# Patient Record
Sex: FEMALE | Race: WHITE | URBAN - METROPOLITAN AREA
[De-identification: names, ages, dates, MRNs, and addresses within clinical notes are randomized per-mention and may not be internally consistent; named-entity substitution may affect disease eponyms.]

---

## 2017-05-01 ENCOUNTER — OFFICE VISIT (OUTPATIENT)
Dept: ORTHOPEDIC SURGERY | Age: 57
End: 2017-05-01

## 2017-05-01 VITALS
DIASTOLIC BLOOD PRESSURE: 77 MMHG | HEART RATE: 68 BPM | BODY MASS INDEX: 27.47 KG/M2 | HEIGHT: 67 IN | WEIGHT: 175 LBS | SYSTOLIC BLOOD PRESSURE: 117 MMHG

## 2017-05-01 DIAGNOSIS — M79.671 FOOT PAIN, RIGHT: Primary | ICD-10-CM

## 2017-05-01 DIAGNOSIS — M65.9 TENOSYNOVITIS OF RIGHT FOOT: ICD-10-CM

## 2017-05-01 PROCEDURE — 99203 OFFICE O/P NEW LOW 30 MIN: CPT | Performed by: PODIATRIST

## 2017-05-01 PROCEDURE — 73630 X-RAY EXAM OF FOOT: CPT | Performed by: PODIATRIST

## 2017-05-01 RX ORDER — POLYETHYLENE GLYCOL 3350 17 G/17G
17 POWDER, FOR SOLUTION ORAL DAILY PRN
COMMUNITY

## 2017-05-01 RX ORDER — M-VIT,TX,IRON,MINS/CALC/FOLIC 27MG-0.4MG
1 TABLET ORAL DAILY
COMMUNITY

## 2017-10-02 ENCOUNTER — OFFICE VISIT (OUTPATIENT)
Dept: ORTHOPEDIC SURGERY | Age: 57
End: 2017-10-02

## 2017-10-02 VITALS — BODY MASS INDEX: 27.47 KG/M2 | HEIGHT: 67 IN | WEIGHT: 175.04 LBS

## 2017-10-02 DIAGNOSIS — S83.242A TEAR OF MEDIAL MENISCUS OF LEFT KNEE, CURRENT, UNSPECIFIED TEAR TYPE, INITIAL ENCOUNTER: ICD-10-CM

## 2017-10-02 DIAGNOSIS — S83.242A TEAR OF MEDIAL MENISCUS OF LEFT KNEE, UNSPECIFIED TEAR TYPE, UNSPECIFIED WHETHER OLD OR CURRENT TEAR, INITIAL ENCOUNTER: ICD-10-CM

## 2017-10-02 DIAGNOSIS — M17.12 PATELLOFEMORAL ARTHRITIS OF LEFT KNEE: ICD-10-CM

## 2017-10-02 DIAGNOSIS — M25.561 PAIN IN BOTH KNEES, UNSPECIFIED CHRONICITY: Primary | ICD-10-CM

## 2017-10-02 DIAGNOSIS — M22.41 CHONDROMALACIA PATELLAE, RIGHT KNEE: ICD-10-CM

## 2017-10-02 DIAGNOSIS — M25.562 PAIN IN BOTH KNEES, UNSPECIFIED CHRONICITY: Primary | ICD-10-CM

## 2017-10-02 PROCEDURE — 73564 X-RAY EXAM KNEE 4 OR MORE: CPT | Performed by: ORTHOPAEDIC SURGERY

## 2017-10-02 PROCEDURE — 99214 OFFICE O/P EST MOD 30 MIN: CPT | Performed by: ORTHOPAEDIC SURGERY

## 2017-10-02 NOTE — PROGRESS NOTES
Lachman, drawer, pivot shift and posterior drawer. Good medial and lateral stability at 0 and 30°. Mild lateral tenderness in the mid body but significant posterior medial joint line tenderness with increased pain with Agapito sign. Right knee reveals mild or peripatellar and retropatellar tenderness medial and lateral facet. Negative apprehension sign. Ligamentous stability good in all directions. No significant lateral or medial joint line tenderness. Negative Agapito sign. Range of Motion:  0-125° left and right    Strength:  Quadriceps strength 3/5 left and right    Special Tests:  Negative Homans sign left and right    Skin: There are no rashes, ulcerations or lesions. Gait: Normal gait today    Reflex intact    Additional Comments:       Additional Examinations:         Right Lower Extremity: Examination of the right lower extremity does not show any tenderness, deformity or injury. Range of motion is unremarkable. There is no gross instability. There are no rashes, ulcerations or lesions. Strength and tone are normal.  Left Lower Extremity: Examination of the left lower extremity does not show any tenderness, deformity or injury. Range of motion is unremarkable. There is no gross instability. There are no rashes, ulcerations or lesions. Strength and tone are normal.    Radiology:     X-rays obtained and reviewed in office:  Views standing AP, PA, lateral and sunrise  Location left and right knee  Impression left knee reveals mild to moderate retropatellar spurring with pretty good maintenance of medial and lateral compartments. Right knee reveals only minimal retropatellar spurring and good maintenance of medial and lateral compartments. 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

## 2017-10-04 ENCOUNTER — TELEPHONE (OUTPATIENT)
Dept: ORTHOPEDIC SURGERY | Age: 57
End: 2017-10-04

## 2017-10-06 ENCOUNTER — HOSPITAL ENCOUNTER (OUTPATIENT)
Dept: PHYSICAL THERAPY | Facility: MEDICAL CENTER | Age: 57
Discharge: HOME | End: 2017-10-31
Admitting: ORTHOPAEDIC SURGERY

## 2017-10-06 ENCOUNTER — TELEPHONE (OUTPATIENT)
Dept: ORTHOPEDIC SURGERY | Age: 57
End: 2017-10-06

## 2017-10-06 NOTE — PLAN OF CARE
deg    Strength/Neuromuscular control:  Voluntary quadriceps contraction:   [] Good (Strong and voluntary contraction)  [] Good - (voluntary but assymetrical contraction)   [x] Fair + (voluntary but weak contraction)   [] Fair - (voluntary but inconsistent contraction)  [] Poor (no volitional contraction)     Right Left   Hip flexion 4 4   Hip extension 4 4   Hip abduction 4- 4-   Hip adduction     Hip IR 4 4   Hip ER 4 4   Knee flexion 4 4-   Knee extension 4+ 3-   Ankle dorsiflexion 4 4   Ankle plantarflexion 4 4   Ankle inversion     Ankle eversion     Great toe flexion     Great toe extension            Flexibility:  - Hamstring: moderate bilaterally  - Piriformis: moderate bilaterally  - Quadriceps: moderate bilaterally  - Gastrocnemius: moderate bilaterally    Dermatomal Sensation:  - Dermatomal sensation was intact to light touch bilaterally throughout upper and lower extremities. Deep tendon reflexes:  - DTR's were symmetrical and intact throughout.     Orthopedic Special Tests:    - Joint Line Compression  - patellar laxity, crepitus, tilt, height    Functional Mobility/Transfers:   Functional transitions:  Sit to stand: []slow and labored [x]independent []min assist []mod assist []max assist   Plinth mobility:[]slow and labored [x]independent []min assist []mod assist []max assist    Gait: (include devices/WB status)  - demonstrated no gait deviations and a normal gait pattern  Weightbearing:  []NWB  []WBAT  [x]unrestricted   []other:      Special Tests/Functional tests:  Timed Up and Go (TUG):  [x]   12 seconds or faster (0% functional limitation)  []   13-14 seconds (At least 1% but less than 20% functional limitation)  []   15-16 seconds (At least 20% but less than 40% functional limitation)  []   17-18 seconds (At least 40% but less than 60% functional limitation)  []   19-20 seconds (At least 60% but less than 80% functional limitation)  []   21-22 seconds (At least 80% but less than 100% functional EVAL (MOD) 25346 (typically 30 minutes face-to-face)  [] EVAL (HIGH) 22311 (typically 45 minutes face-to-face)  [] RE-EVAL     Co-morbidities/Complexities (which will affect course of rehabilitation):   []None           Arthritic conditions   []Rheumatoid arthritis (M05.9)  []Osteoarthritis (M19.91)   Cardiovascular conditions   []Hypertension (I10)  []Hyperlipidemia (E78.5)  []Angina pectoris (I20)  []Atherosclerosis (I70)   Musculoskeletal conditions   []Disc pathology   []Congenital spine pathologies   []Prior surgical intervention  []Osteoporosis (M81.8)  []Osteopenia (M85.8)   Endocrine conditions   []Hypothyroid (E03.9)  []Hyperthyroid Gastrointestinal conditions   []Constipation (P09.18)   Metabolic conditions   []Morbid obesity (E66.01)  []Diabetes type 1(E10.65) or 2 (E11.65)   []Neuropathy (G60.9)     Pulmonary conditions   []Asthma (J45)  []Coughing   []COPD (J44.9)   Psychological Disorders  []Anxiety (F41.9)  []Depression (F32.9)   []Other:   [x]Other:   Hx of skin cancer, difficulty swallowing due to esophageal size         PLAN  Frequency/Duration:  2 days per week for 6 Weeks:  Interventions:  - Therapeutic exercise including: strength training, ROM/flexibility, NMR and proprioception for the proximal hip, trunk and Lower extremity  - Manual therapy as indicated including Dry Needling/IASTM, STM, PROM, Gr I-IV mobilizations, spinal mobilization/manipulation.   - Modalities as needed including: thermal agents, E-stim, US, iontophoresis as indicated. - Patient education on joint protection, activity modification, progression of HEP. HEP instruction: (see scanned forms)    GOALS:  Patient stated goal: alleviate or minimize pain    Therapist goals for Patient:   Short Term Goals: To be achieved in: 2 weeks  - Independent in HEP and progression per patient tolerance, in order to prevent re-injury.    - Patient will have a decrease in pain to facilitate improvement in movement, function, and ADLs as indicated by Functional Deficits. Long Term Goals: To be achieved in: 6 weeks  - The patient is expected to demonstrate less than 0% impairment, limitation or restriction in:  - walking and moving around (mobility)  - Patient will demonstrate increased passive and active ROM to full, symmetrical and painfree to allow for proper joint functioning as indicated by patients Functional Deficits. - Patient will demonstrate an increase in Strength to full and painfree to resistance testing to allow for proper functional mobility as indicated by patients Functional Deficits. - Patient will return to desired, higher level,  functional activities without increased symptoms or restriction.       Electronically signed by:  Margarito Brothers, PT, DPT, ATC

## 2017-10-06 NOTE — FLOWSHEET NOTE
[facility], [address] [facility], [address]          Phone: [phone]   Fax:     [phone]    Physical Therapy Daily Treatment Note  Date:  10/6/2017    Patient Name:  Robin Hernandez    :  1960  MRN: [id number]  Restrictions/Precautions:    Medical/Treatment Diagnosis Information:  Diagnosis: M25.561, M25.562 (ICD-10-CM) - Pain in both knees, unspecified chronicity; S83.242A (ICD-10-CM) - Tear of medial meniscus of left knee, unspecified tear type, unspecified whether old or current tear, initial encounter  Treatment Diagnosis: M25.561, M25.562 bilateral knee pain  Insurance/Certification information:  PT Insurance Information: [place], $7000 OOP max, $50/25 copay, 20% co-insurance, 20 visits  Physician Information:  Referring Practitioner: Matt Lundberg MD  Plan of care signed (Y/N):     Date of Patient follow up with Physician:     G-Code (if applicable):      Date G-Code Applied:    PT G-Codes  Functional Assessment Tool Used: LEFS  Score: 9% disability  Functional Limitation: Mobility: Walking and moving around  Mobility: Walking and Moving Around Current Status ():  At least 1 percent but less than 20 percent impaired, limited or restricted  Mobility: Walking and Moving Around Goal Status (): 0 percent impaired, limited or restricted    Progress Note: [x]  Yes  []  No  Next due by: Visit #10       Latex Allergy:  [x]NO      []YES  Preferred Language for Healthcare:   [x]English       []other:    Visit # Insurance Allowable   1 20     Pain level:  3/10     SUBJECTIVE:  See eval    OBJECTIVE: See eval  Observation:   Test measurements:      RESTRICTIONS/PRECAUTIONS: hx of cancer, difficulty swallowing    Exercises/Interventions: ADD RESISTANCE NV    Hamstring stretch Verbal cueing for 2 for 30 seconds bilat   Quad stretch  IT band stretch NV  Verbal cueing for 2 for 30 secondsbilat   Gastroc stretch- slant board Verbal cueing for 2 for 30 seconds   Figure 4/knee to opp shoulder piriformis stretch Verbal cueing for 2 for 30 seconds bilat       Standing heel and toe raises NV   Flexion SLR Verbal cueing for 2 set of 10 bilat   Abduction SLR  Clam shells- sidelying Verbal cueing for 2 set of 10 bilat  Verbal cueing for 2 set of 10 bilat   Bridging    Education: POC, HEP Verbal cueing for 2 set of 10    9'   - HEP provided and a copy can be found in the media file. - Provided instruction in patellofemoral protection strategies to include minimizing stair climbing, no flexion under weightbearing greater than 40 degrees of flexion, no open chain flexion to extension against resistance ( including knee extension machine and breast stroke kicking), no deep knee bending, no kneeling for extended periods and emphasis on lower impact cardio exercise. Therapeutic Exercise and NMR EXR  [x] (74129) Provided verbal/tactile cueing for activities related to strengthening, flexibility, endurance, ROM for improvements in LE, proximal hip, and core control with self care, mobility, lifting, ambulation.  [] (26331) Provided verbal/tactile cueing for activities related to improving balance, coordination, kinesthetic sense, posture, motor skill, proprioception  to assist with LE, proximal hip, and core control in self care, mobility, lifting, ambulation and eccentric single leg control.      NMR and Therapeutic Activities:    [] (18222 or 18832) Provided verbal/tactile cueing for activities related to improving balance, coordination, kinesthetic sense, posture, motor skill, proprioception and motor activation to allow for proper function of core, proximal hip and LE with self care and ADLs  [] (11123) Gait Re-education- Provided training and instruction to the patient for proper LE, core and proximal hip recruitment and positioning and eccentric body weight control with ambulation re-education including up and down functioning as indicated by patients Functional Deficits. - Patient will demonstrate an increase in Strength to full and painfree to resistance testing to allow for proper functional mobility as indicated by patients Functional Deficits. - Patient will return to desired, higher level,  functional activities without increased symptoms or restriction. Progression Towards Functional goals:  [] Patient is progressing as expected towards functional goals listed. [] Progression is slowed due to complexities listed. [] Progression has been slowed due to co-morbidities. [x] Plan just implemented, too soon to assess goals progression  [] Other:     ASSESSMENT:  See eval    Treatment/Activity Tolerance:  [x] Patient tolerated treatment well [] Patient limited by fatique  [] Patient limited by pain  [] Patient limited by other medical complications  [x] Other: PT noted fatigue with hip abduction exercises but denied pain and was able to complete the interventions. Pt is appropriate for resistance next visit. Prognosis: [x] Good [] Fair  [] Poor    Patient Requires Follow-up: [x] Yes  [] No    PLAN: See eval  [] Continue per plan of care [] Alter current plan (see comments)  [x] Plan of care initiated [] Hold pending MD visit [] Discharge    Plan for Next Session:  Progress quad control activities as tolerated with a functional progression toward gravity reduced track and more upright functional positions.     Electronically signed by: Isabella Damico PT, DPT, ATC

## 2017-10-16 ENCOUNTER — OFFICE VISIT (OUTPATIENT)
Dept: ORTHOPEDIC SURGERY | Age: 57
End: 2017-10-16

## 2017-10-16 VITALS — HEIGHT: 67 IN | WEIGHT: 175.04 LBS | BODY MASS INDEX: 27.47 KG/M2

## 2017-10-16 DIAGNOSIS — S72.499D: ICD-10-CM

## 2017-10-16 PROCEDURE — 99212 OFFICE O/P EST SF 10 MIN: CPT | Performed by: ORTHOPAEDIC SURGERY

## 2017-10-16 NOTE — PROGRESS NOTES
MRI scan reveals a nondisplaced osteochondral fracture at the peripheral articulating medial femoral condyle. No medial meniscus tear. 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 Review of systems from October 2, 2017 unchanged. She states the pain in her knee medially is significantly improved and basically gone at this time. There is no locking, catching, or giving way. She has no effusion. Examination of her left knee today reveals no effusion. There is no significant localized tenderness. Negative Agapito sign. No lateral tenderness. Ligamentous stability good in all directions. At this point I think she's doing very well with this osteochondral fracture which is nondisplaced but was cautioned against overuse and to use protective weightbearing if she starts to experience any further soreness. We have recommended going ahead with continued home exercises and will probably see her back on an as-needed basis.     I spent 10+ minutes with the patient including 5+ minutes face to face with the patient discussing and answering questions regarding their osteochondral fracture of the medial compartment left knee

## 2017-11-01 ENCOUNTER — HOSPITAL ENCOUNTER (OUTPATIENT)
Dept: OTHER | Age: 57
Discharge: HOME | End: 2017-11-30
Attending: ORTHOPAEDIC SURGERY | Admitting: ORTHOPAEDIC SURGERY